# Patient Record
Sex: FEMALE | Race: WHITE
[De-identification: names, ages, dates, MRNs, and addresses within clinical notes are randomized per-mention and may not be internally consistent; named-entity substitution may affect disease eponyms.]

---

## 2018-03-10 ENCOUNTER — HOSPITAL ENCOUNTER (EMERGENCY)
Dept: HOSPITAL 25 - UCCORT | Age: 33
Discharge: HOME | End: 2018-03-10
Payer: COMMERCIAL

## 2018-03-10 VITALS — DIASTOLIC BLOOD PRESSURE: 85 MMHG | SYSTOLIC BLOOD PRESSURE: 113 MMHG

## 2018-03-10 DIAGNOSIS — J02.0: Primary | ICD-10-CM

## 2018-03-10 PROCEDURE — 99212 OFFICE O/P EST SF 10 MIN: CPT

## 2018-03-10 PROCEDURE — 87651 STREP A DNA AMP PROBE: CPT

## 2018-03-10 PROCEDURE — G0463 HOSPITAL OUTPT CLINIC VISIT: HCPCS

## 2018-03-10 NOTE — UC
Throat Pain/Nasal Lele HPI





- HPI Summary


HPI Summary: 





31 y/o female presents to the urgent care c/o sore throat  fever and headache 

for the past 3 days.  Pt is a  and she is  concerned about 

possible strep throat. Pain w/ swallowing is 4/10 associated w/ fever and 

chills last night. She has taking Tylenol PO o alleviate symptoms, last dose at 

1100am. Pt denies cough, URI, SOB, chest pain, abdominal pain, N/V/D





- History of Current Complaint


Chief Complaint: UCGeneralIllness


Stated Complaint: SORE THROAT


Time Seen by Provider: 03/10/18 18:49


Hx Obtained From: Patient


Onset/Duration: Gradual Onset, Lasting Days - 3 days, Still Present, Worse 

Since - last night


Severity: Moderate


Pain Intensity: 5


Pain Scale Used: 0-10 Numeric


Cough: None


Associated Signs & Symptoms: Positive: Dysphagia, Fever





- Epiglottits Risk Factors


Epiglottis Risk Factors: Negative





- Allergies/Home Medications


Allergies/Adverse Reactions: 


 Allergies











Allergy/AdvReac Type Severity Reaction Status Date / Time


 


No Known Allergies Allergy   Verified 03/10/18 18:30











Home Medications: 


 Home Medications





Acetaminophen TAB* [Tylenol TAB*] 650 mg PO Q4H PRN 03/10/18 [History Confirmed 

03/10/18]


Metoprolol Tartrate TAB* [Lopressor TAB*] 12.5 mg PO DAILY 03/10/18 [History 

Confirmed 03/10/18]


Oral Contraceptives DAILY 03/10/18 [History]











PMH/Surg Hx/FS Hx/Imm Hx


Previously Healthy: Yes


Other Cardiovascular History: Aortic stenosis





- Surgical History


Surgical History: None





- Family History


Known Family History: Positive: None - Pt denies PMHX





- Social History


Occupation: Employed Full-time


Lives: With Family


Alcohol Use: Rare


Substance Use Type: None


Smoking Status (MU): Never Smoked Tobacco





Review of Systems


Constitutional: Fever, Chills


Skin: Negative


Eyes: Negative


ENT: Sore Throat


Respiratory: Negative


Cardiovascular: Negative


Gastrointestinal: Negative


Genitourinary: Negative


Motor: Negative


Neurovascular: Negative


Musculoskeletal: Negative


Neurological: Headache


Psychological: Negative


Is Patient Immunocompromised?: No


All Other Systems Reviewed And Are Negative: Yes





Physical Exam





- Summary


Physical Exam Summary: 





VITAL SIGNS: Reviewed. 


GENERAL: Patient is a well developed and nourished female who is sitting 

comfortable in the examining table. Patient is not in any acute respiratory 

distress. 


HEAD AND FACE: No signs of trauma. No ecchymosis, hematomas or skull 

depressions. No sinus tenderness. 


EYES: PERRLA, EOMI x 2, No injected conjunctiva, no nystagmus. No photophobia.


EARS: Hearing grossly intact. Ear canals and tympanic membranes are within 

normal limits. 


MOUTH: Positive pharynx with moderate erythema, exudates, palatal petechiae. B/

L tonsillar enlargement with exudate. Uvula in midline. 


NECK: Supple, trachea is midline, Positive anterior cervical lymphadenopathy, 

no JVD, no carotid bruit, no c-spine tenderness, neck with full ROM. No 

meningeal signs, no Kernig's or brudzinskis signs. 


CHEST: Symmetric, no tenderness at palpation 


LUNGS: Clear to auscultation bilaterally. No wheezing or crackles.


CVS: Regular rate and rhythm, S1 and S2 present, no murmurs or gallops 

appreciated. 


ABDOMEN: Soft, non-tender. No signs of distention. No rebound no guarding, and 

no masses palpated. Bowel sounds are normal. 


EXTREMITIES: FROM in all major joints, no edema, no cyanosis or clubbing.


NEURO: Alert and oriented x 3. No acute neurological deficits. Speech is normal 

and follows commands. 


SKIN: Dry and warm 








Triage Information Reviewed: Yes


Vital Signs: 


 Initial Vital Signs











Temp  98.9 F   03/10/18 18:26


 


Pulse  123   03/10/18 18:26


 


Resp  16   03/10/18 18:26


 


BP  113/85   03/10/18 18:26


 


Pulse Ox  100   03/10/18 18:26














Throat Pain/Nasal Course/Dx





- Course


Course Of Treatment: 31 y/o female presents to the urgent care c/o sore throat  

fever and headache for the past 3 days.  Pt is a  and she is  

concerned about possible strep throat. Pain w/ swallowing is 4/10 associated w/ 

fever and chills last night. She has taking Tylenol PO o alleviate symptoms, 

last dose at 1100am. Pt denies cough, URI, SOB, chest pain, abdominal pain, N/V/

D. Hx obtained. Pt w/ pharyngitis on examination. Rapid strep ordered: result: 

positive. Strep pharyngitis.  Rx Amoxicillin PO and Ibuprofen PO for pain and 

swelling. PT  Advised on hand washing to avoid spreading. Also advised to rest, 

eat well and avoid strenuous exercise. If symptoms do not improve or worsen 

advised to return to the urgent care or f/u with her PCP for further evaluation 

and treatment. PT understood and agreed





- Differential Dx/Diagnosis


Differential Diagnosis/HQI/PQRI: Influenza, Laryngitis, Mononucleosis, 

Pharyngitis, Sinusitis, Tonsillitis, URI


Provider Diagnoses: 1-Strep pharyngitis





Discharge





- Discharge Plan


Condition: Stable


Disposition: HOME


Prescriptions: 


Amoxicillin PO (*) [Amoxicillin 500 MG CAP*] 500 mg PO Q12H #19 cap


Ibuprofen TAB* [Motrin TAB* 800 MG] 800 mg PO Q6H PRN #20 tab


 PRN Reason: Pain


Patient Education Materials:  Strep Throat (ED)


Referrals: 


Mimi Hernandez [Primary Care Provider] - 3 Days


Additional Instructions: 


1- Please take the full course of the antibiotic to avoid resistance.


2-Please take ibuprofen PO q6-8hrs prn as instructed after meals to alleviate 

pain and swelling. Increase fluid intake, eat well, rest and avoid strenuous 

exercise


3-If symptoms do not improve or worsen please return to the urgent care or f/u 

with your PCP 2-3 days for further evaluation and treatment.